# Patient Record
Sex: FEMALE | Race: WHITE | NOT HISPANIC OR LATINO | URBAN - METROPOLITAN AREA
[De-identification: names, ages, dates, MRNs, and addresses within clinical notes are randomized per-mention and may not be internally consistent; named-entity substitution may affect disease eponyms.]

---

## 2019-09-24 ENCOUNTER — EMERGENCY (EMERGENCY)
Facility: HOSPITAL | Age: 19
LOS: 1 days | Discharge: HOME | End: 2019-09-24
Attending: EMERGENCY MEDICINE | Admitting: EMERGENCY MEDICINE
Payer: COMMERCIAL

## 2019-09-24 VITALS
RESPIRATION RATE: 18 BRPM | SYSTOLIC BLOOD PRESSURE: 125 MMHG | DIASTOLIC BLOOD PRESSURE: 85 MMHG | HEIGHT: 64 IN | WEIGHT: 154.98 LBS | OXYGEN SATURATION: 99 % | TEMPERATURE: 98 F | HEART RATE: 84 BPM

## 2019-09-24 PROCEDURE — 99283 EMERGENCY DEPT VISIT LOW MDM: CPT

## 2019-09-24 PROCEDURE — 81025 URINE PREGNANCY TEST: CPT

## 2019-09-24 RX ORDER — ACETAMINOPHEN 500 MG
650 TABLET ORAL ONCE
Refills: 0 | Status: COMPLETED | OUTPATIENT
Start: 2019-09-24 | End: 2019-09-24

## 2019-09-24 RX ADMIN — Medication 650 MILLIGRAM(S): at 19:37

## 2019-09-24 NOTE — ED PROVIDER NOTE - PATIENT PORTAL LINK FT
You can access the FollowMyHealth Patient Portal offered by [facility] by registering at the following website: [email or web address]. By joining [facility]’s FollowMyHealth portal, you will also be able to view your health information using other applications (apps) compatible with our system.

## 2019-09-24 NOTE — ED PROVIDER NOTE - OBJECTIVE STATEMENT
Pt is 19 y/o female with no significant PMhx here for eval s/p mechanical fall earlier today. 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 Pt is 17 y/o female with no significant PMhx here for eval s/p mechanical fall earlier today. As per pt she fell down from the slide (pt works as a  with kids, was trying to jump down from about 5 feet onto wood chips covered ground, her foot got caught in the slide and pt fell forward, face first, landed "flat on her stomach", (+) head injury, no LOC. Now c/o frontal HA, denies visual changes, nausea, vomiting, denies dizziness, ext weakness or numbness, denies otorhinorrhea, confusion/ams, as per family pt at baseline mentation,. denies use of blood thinners, LMP - last week. Took excedrin PTA with some relief; Pt is 19 y/o female with no significant PMhx here for eval s/p mechanical fall earlier today. 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 Now c/o frontal HA, denies visual changes, nausea, vomiting, denies dizziness, ext weakness or numbness, denies otorhinorrhea, confusion/ams, as per family pt at baseline mentation,. denies use of blood thinners, LMP - last week. Took excedrin PTA with some relief;

## 2019-09-24 NOTE — ED PROVIDER NOTE - CLINICAL SUMMARY MEDICAL DECISION MAKING FREE TEXT BOX
Pt is 19 y/o female with no significant PMhx here for eval s/p mechanical fall earlier today. As per pt she fell down from the slide (pt works as a  with kids, was trying to jump down from about 5 feet onto wood chips covered ground, her foot got caught in the slide and pt fell forward, face first, landed "flat on her stomach", (+) head injury, no LOC. Now c/o frontal HA, denies visual changes, nausea, vomiting, denies dizziness, ext weakness or numbness, denies otorhinorrhea, confusion/ams, as per family pt at baseline mentation, neuro exam intact, HA improved with excedrin pt took pta, pt not on blood thinners - given extensive head injury instructions, will f/u with pcp;

## 2019-09-24 NOTE — ED PROVIDER NOTE - CHPI ED SYMPTOMS NEG
no syncope/no weakness/no vomiting/no blurred vision/no dizziness/no change in level of consciousness/no nausea

## 2019-09-24 NOTE — ED ADULT NURSE NOTE - OBJECTIVE STATEMENT
Pt is alert, came tot he Er due to a fall from a Slide of about 6 feet height and landed on the ground about 5 30 PM today. Pt took Excedrin . Denies nausea ir vomiting or LOC.

## 2019-09-24 NOTE — ED PROVIDER NOTE - ATTENDING CONTRIBUTION TO CARE
pt c/o headache, mild to moderate, after fall today about 2.5hr pta. pt was on a 5 to 6 ft high slide, tried to climb off and fell onto wood chips covered ground.  no LOC. no neck pain. no back/chest/abd sandip. no weakness, numbness, speech or vision changes.  no nausea/vomiting. headache mildly better with excedrin.    exam:   General: well appearing, NAD.   HEENT: eyes perrl, nose normal, OP no erythema/exudate/swelling. head without swelling/tenderness/ discoloration or other signs of trauma.  no hemotympanum  cor: RRR, s1s2, 2+rad pulses.   lungs: ctabl, no resp distress.   abd: soft, ntnd.   neuro: a&ox3, cn2-12 intact, COUCH, 5/5 strength c nl sensation all extremities, nl coordination.   MSK: no extremity swelling or tenderness. no c/t/L spine tenderness.  Skin: normal, no rash    AP: head injury sp fall.  mechanism appears minor. no external signs of head trauma. neuro exam normal.  no concern for signif head injury.  d/w pt r/b/a of doing CT head. pt agrees wit recommendation not to do ct. recommended po analgesics. f/u c neuro

## 2019-09-24 NOTE — ED PROVIDER NOTE - NSFOLLOWUPINSTRUCTIONS_ED_ALL_ED_FT
Follow up with your PMD within 24-48 hrs hours.  Rest, Take Tylenol 650mg 1 tab every 4-6 hours as needed for pain . You may have a headache associated with nausea and lightheadedness in the next few hours/days. This is called a concussion and does not warrant return to the Emergency department unless you develop significant worsening of pain, profuse vomiting, dizziness, changes in vision, difficulty walking/speaking, weakness or numbness to your extremities. Worsening or new concerning symptoms return to the emergency department.    Head Injury, Adult  ImageThere are many types of head injuries. They can be as minor as a bump. Some head injuries can be worse. Worse injuries include:  A strong hit to the head that hurts the brain (concussion).  A bruise of the brain (contusion). This means there is bleeding in the brain that can cause swelling.  A cracked skull (skull fracture).  Bleeding in the brain that gathers, gets thick (makes a clot), and forms a bump (hematoma).  Most problems from a head injury come in the first 24 hours. However, you may still have side effects up to 7–10 days after your injury. It is important to watch your condition for any changes.    Follow these instructions at home:  Activity     Rest as much as possible.  Avoid activities that are hard or tiring.  Make sure you get enough sleep.  Limit activities that need a lot of thought or attention, such as:  Watching TV.  Playing memory games and puzzles.  Job-related work or homework.  Working on the computer, social media, and texting.  Avoid activities that could cause another head injury until your doctor says it is okay. This includes playing sports.  Ask your doctor when it is safe for you to go back to your normal activities, such as work or school. Ask your doctor for a step-by-step plan for slowly going back to your normal activities.  Ask your doctor when you can drive, ride a bicycle, or use heavy machinery. Never do these activities if you are dizzy.  Lifestyle     Do not drink alcohol until your doctor says it is okay.  Avoid drug use.  If it is harder than usual to remember things, write them down.  If you are easily distracted, try to do one thing at a time.  Talk with family members or close friends when making important decisions.  Tell your friends, family, a trusted coworker, and  about your injury, symptoms, and limits (restrictions). Have them watch for any problems that are new or getting worse.  General instructions     Take over-the-counter and prescription medicines only as told by your doctor.  Have someone stay with you for 24 hours after your head injury. This person should watch you for any changes in your symptoms and be ready to get help.  Keep all follow-up visits as told by your doctor. This is important.  How is this prevented?  Work on your balance and strength. This can help you avoid falls.  Wear a seatbelt when you are in a moving vehicle.  Wear a helmet when:  Riding a bicycle.  Skiing.  Doing any other sport or activity that has a risk of injury.  Drink alcohol only in moderation.  Make your home safer by:  Getting rid of clutter from the floors and stairs, like things that can make you trip.  Using grab bars in bathrooms and handrails by stairs.  Placing non-slip mats on floors and in bathtubs.  Putting more light in dim areas.  Get help right away if:  You have:  A very bad (severe) headache that is not helped by medicine.  Trouble walking or weakness in your arms and legs.  Clear or bloody fluid coming from your nose or ears.  Changes in your seeing (vision).  Jerky movements that you cannot control (seizure).  You throw up (vomit).  Your symptoms get worse.  You lose balance.  Your speech is slurred.  You pass out.  You are sleepier and have trouble staying awake.  The black centers of your eyes (pupils) change in size.

## 2019-09-30 DIAGNOSIS — S09.90XA UNSPECIFIED INJURY OF HEAD, INITIAL ENCOUNTER: ICD-10-CM
